# Patient Record
Sex: FEMALE | Race: WHITE | Employment: OTHER | URBAN - METROPOLITAN AREA
[De-identification: names, ages, dates, MRNs, and addresses within clinical notes are randomized per-mention and may not be internally consistent; named-entity substitution may affect disease eponyms.]

---

## 2023-04-04 ENCOUNTER — HOSPITAL ENCOUNTER (OUTPATIENT)
Age: 58
Discharge: ANOTHER HOSPITAL | End: 2023-04-04
Payer: COMMERCIAL

## 2023-04-04 ENCOUNTER — APPOINTMENT (OUTPATIENT)
Dept: GENERAL RADIOLOGY | Facility: HOSPITAL | Age: 58
End: 2023-04-04
Attending: EMERGENCY MEDICINE
Payer: COMMERCIAL

## 2023-04-04 ENCOUNTER — HOSPITAL ENCOUNTER (EMERGENCY)
Facility: HOSPITAL | Age: 58
Discharge: HOME | End: 2023-04-04
Attending: EMERGENCY MEDICINE
Payer: COMMERCIAL

## 2023-04-04 VITALS
DIASTOLIC BLOOD PRESSURE: 87 MMHG | BODY MASS INDEX: 20.72 KG/M2 | OXYGEN SATURATION: 98 % | HEART RATE: 109 BPM | TEMPERATURE: 100 F | SYSTOLIC BLOOD PRESSURE: 127 MMHG | HEIGHT: 67 IN | WEIGHT: 132 LBS | RESPIRATION RATE: 23 BRPM

## 2023-04-04 VITALS
HEART RATE: 101 BPM | DIASTOLIC BLOOD PRESSURE: 95 MMHG | RESPIRATION RATE: 20 BRPM | SYSTOLIC BLOOD PRESSURE: 138 MMHG | OXYGEN SATURATION: 97 % | TEMPERATURE: 98 F

## 2023-04-04 DIAGNOSIS — R07.89 CHEST PAIN, ATYPICAL: Primary | ICD-10-CM

## 2023-04-04 DIAGNOSIS — R07.9 ACUTE CHEST PAIN: Primary | ICD-10-CM

## 2023-04-04 LAB
ALBUMIN SERPL-MCNC: 3.9 G/DL (ref 3.4–5)
ALBUMIN/GLOB SERPL: 1 {RATIO} (ref 1–2)
ALP LIVER SERPL-CCNC: 72 U/L
ALT SERPL-CCNC: 18 U/L
ANION GAP SERPL CALC-SCNC: 9 MMOL/L (ref 0–18)
AST SERPL-CCNC: 16 U/L (ref 15–37)
ATRIAL RATE: 93 BPM
BASOPHILS # BLD AUTO: 0.05 X10(3) UL (ref 0–0.2)
BASOPHILS NFR BLD AUTO: 0.4 %
BILIRUB SERPL-MCNC: 0.6 MG/DL (ref 0.1–2)
BILIRUB UR QL: NEGATIVE
BUN BLD-MCNC: 8 MG/DL (ref 7–18)
BUN/CREAT SERPL: 11.3 (ref 10–20)
CALCIUM BLD-MCNC: 9.5 MG/DL (ref 8.5–10.1)
CHLORIDE SERPL-SCNC: 97 MMOL/L (ref 98–112)
CLARITY UR: CLEAR
CO2 SERPL-SCNC: 26 MMOL/L (ref 21–32)
CREAT BLD-MCNC: 0.71 MG/DL
DEPRECATED RDW RBC AUTO: 41.1 FL (ref 35.1–46.3)
EOSINOPHIL # BLD AUTO: 0 X10(3) UL (ref 0–0.7)
EOSINOPHIL NFR BLD AUTO: 0 %
ERYTHROCYTE [DISTWIDTH] IN BLOOD BY AUTOMATED COUNT: 11.8 % (ref 11–15)
GFR SERPLBLD BASED ON 1.73 SQ M-ARVRAT: 99 ML/MIN/1.73M2 (ref 60–?)
GLOBULIN PLAS-MCNC: 3.9 G/DL (ref 2.8–4.4)
GLUCOSE BLD-MCNC: 127 MG/DL (ref 70–99)
GLUCOSE UR-MCNC: NORMAL MG/DL
HCT VFR BLD AUTO: 38.5 %
HGB BLD-MCNC: 13 G/DL
HGB UR QL STRIP.AUTO: NEGATIVE
IMM GRANULOCYTES # BLD AUTO: 0.05 X10(3) UL (ref 0–1)
IMM GRANULOCYTES NFR BLD: 0.4 %
KETONES UR-MCNC: 20 MG/DL
LEUKOCYTE ESTERASE UR QL STRIP.AUTO: NEGATIVE
LYMPHOCYTES # BLD AUTO: 0.29 X10(3) UL (ref 1–4)
LYMPHOCYTES NFR BLD AUTO: 2.2 %
MCH RBC QN AUTO: 32.3 PG (ref 26–34)
MCHC RBC AUTO-ENTMCNC: 33.8 G/DL (ref 31–37)
MCV RBC AUTO: 95.5 FL
MONOCYTES # BLD AUTO: 0.71 X10(3) UL (ref 0.1–1)
MONOCYTES NFR BLD AUTO: 5.4 %
NEUTROPHILS # BLD AUTO: 11.98 X10 (3) UL (ref 1.5–7.7)
NEUTROPHILS # BLD AUTO: 11.98 X10(3) UL (ref 1.5–7.7)
NEUTROPHILS NFR BLD AUTO: 91.6 %
NITRITE UR QL STRIP.AUTO: NEGATIVE
OSMOLALITY SERPL CALC.SUM OF ELEC: 274 MOSM/KG (ref 275–295)
P AXIS: 64 DEGREES
P-R INTERVAL: 160 MS
PH UR: 5 [PH] (ref 5–8)
PLATELET # BLD AUTO: 333 10(3)UL (ref 150–450)
POTASSIUM SERPL-SCNC: 4.3 MMOL/L (ref 3.5–5.1)
PROT SERPL-MCNC: 7.8 G/DL (ref 6.4–8.2)
PROT UR-MCNC: NEGATIVE MG/DL
Q-T INTERVAL: 370 MS
QRS DURATION: 76 MS
QTC CALCULATION (BEZET): 460 MS
R AXIS: 35 DEGREES
RBC # BLD AUTO: 4.03 X10(6)UL
SODIUM SERPL-SCNC: 132 MMOL/L (ref 136–145)
SP GR UR STRIP: 1.01 (ref 1–1.03)
T AXIS: 37 DEGREES
TROPONIN I HIGH SENSITIVITY: 3 NG/L
UROBILINOGEN UR STRIP-ACNC: NORMAL
VENTRICULAR RATE: 93 BPM
WBC # BLD AUTO: 13.1 X10(3) UL (ref 4–11)

## 2023-04-04 PROCEDURE — 84484 ASSAY OF TROPONIN QUANT: CPT | Performed by: EMERGENCY MEDICINE

## 2023-04-04 PROCEDURE — 99204 OFFICE O/P NEW MOD 45 MIN: CPT

## 2023-04-04 PROCEDURE — 36415 COLL VENOUS BLD VENIPUNCTURE: CPT | Performed by: EMERGENCY MEDICINE

## 2023-04-04 PROCEDURE — 85025 COMPLETE CBC W/AUTO DIFF WBC: CPT | Performed by: EMERGENCY MEDICINE

## 2023-04-04 PROCEDURE — 84484 ASSAY OF TROPONIN QUANT: CPT

## 2023-04-04 PROCEDURE — 71045 X-RAY EXAM CHEST 1 VIEW: CPT | Performed by: EMERGENCY MEDICINE

## 2023-04-04 PROCEDURE — 85025 COMPLETE CBC W/AUTO DIFF WBC: CPT

## 2023-04-04 PROCEDURE — 93005 ELECTROCARDIOGRAM TRACING: CPT

## 2023-04-04 PROCEDURE — 93010 ELECTROCARDIOGRAM REPORT: CPT

## 2023-04-04 PROCEDURE — 99284 EMERGENCY DEPT VISIT MOD MDM: CPT | Performed by: EMERGENCY MEDICINE

## 2023-04-04 PROCEDURE — 80053 COMPREHEN METABOLIC PANEL: CPT | Performed by: EMERGENCY MEDICINE

## 2023-04-04 PROCEDURE — 80053 COMPREHEN METABOLIC PANEL: CPT

## 2023-04-04 PROCEDURE — 99283 EMERGENCY DEPT VISIT LOW MDM: CPT | Performed by: EMERGENCY MEDICINE

## 2023-04-04 RX ORDER — ASPIRIN 81 MG/1
324 TABLET, CHEWABLE ORAL ONCE
Status: COMPLETED | OUTPATIENT
Start: 2023-04-04 | End: 2023-04-04

## 2023-04-04 NOTE — ED INITIAL ASSESSMENT (HPI)
Reports CP starting about 1145 today. Initially dizzy with nausea, diaphoretic but all symptoms improved.

## 2023-04-04 NOTE — ED INITIAL ASSESSMENT (HPI)
Per pt having chest pain that started around 1145am, states that the time had nausea that has since resolved, denies any vomiting reports feels sob.